# Patient Record
Sex: FEMALE | Race: WHITE | NOT HISPANIC OR LATINO | Employment: OTHER | RURAL
[De-identification: names, ages, dates, MRNs, and addresses within clinical notes are randomized per-mention and may not be internally consistent; named-entity substitution may affect disease eponyms.]

---

## 2022-07-26 ENCOUNTER — HOSPITAL ENCOUNTER (OUTPATIENT)
Dept: RADIOLOGY | Facility: HOSPITAL | Age: 82
Discharge: HOME | End: 2022-07-26
Attending: ORTHOPAEDIC SURGERY
Payer: MEDICARE

## 2022-07-26 DIAGNOSIS — M25.552 HIP PAIN, ACUTE, LEFT: ICD-10-CM

## 2022-07-26 PROCEDURE — 73502 X-RAY EXAM HIP UNI 2-3 VIEWS: CPT | Mod: TC,LT

## 2023-08-24 PROCEDURE — 88305 PATHOLOGY, DERMATOLOGY: ICD-10-PCS | Mod: 26,,, | Performed by: PATHOLOGY

## 2023-08-24 PROCEDURE — 88305 TISSUE EXAM BY PATHOLOGIST: CPT | Mod: TC,SUR | Performed by: DERMATOLOGY

## 2023-08-24 PROCEDURE — 88305 TISSUE EXAM BY PATHOLOGIST: CPT | Mod: 26,,, | Performed by: PATHOLOGY

## 2023-08-25 ENCOUNTER — LAB REQUISITION (OUTPATIENT)
Dept: LAB | Facility: HOSPITAL | Age: 83
End: 2023-08-25
Attending: DERMATOLOGY
Payer: MEDICARE

## 2023-08-25 DIAGNOSIS — D49.2 NEOPLASM OF UNSPECIFIED BEHAVIOR OF BONE, SOFT TISSUE, AND SKIN: ICD-10-CM

## 2023-08-28 LAB
ESTROGEN SERPL-MCNC: NORMAL PG/ML
INSULIN SERPL-ACNC: NORMAL U[IU]/ML
LAB AP GROSS DESCRIPTION: NORMAL
LAB AP LABORATORY NOTES: NORMAL
LAB AP SPEC A DDX: NORMAL
LAB AP SPEC A MORPHOLOGY: NORMAL
LAB AP SPEC A PROCEDURE: NORMAL
LAB AP SPEC B DDX: NORMAL
LAB AP SPEC B MORPHOLOGY: NORMAL
LAB AP SPEC B PROCEDURE: NORMAL
LAB AP SPEC C DDX: NORMAL
LAB AP SPEC C MORPHOLOGY: NORMAL
LAB AP SPEC C PROCEDURE: NORMAL
T3RU NFR SERPL: NORMAL %

## 2023-09-07 PROCEDURE — 88305 TISSUE EXAM BY PATHOLOGIST: CPT | Mod: TC,SUR | Performed by: DERMATOLOGY

## 2023-09-07 PROCEDURE — 88305 PATHOLOGY, DERMATOLOGY: ICD-10-PCS | Mod: 26,,, | Performed by: PATHOLOGY

## 2023-09-07 PROCEDURE — 88305 TISSUE EXAM BY PATHOLOGIST: CPT | Mod: 26,,, | Performed by: PATHOLOGY

## 2023-09-08 ENCOUNTER — LAB REQUISITION (OUTPATIENT)
Dept: LAB | Facility: HOSPITAL | Age: 83
End: 2023-09-08
Attending: DERMATOLOGY
Payer: MEDICARE

## 2023-09-08 DIAGNOSIS — C44.622 SQUAMOUS CELL CARCINOMA OF SKIN OF RIGHT UPPER LIMB, INCLUDING SHOULDER: ICD-10-CM

## 2023-09-11 LAB
ESTROGEN SERPL-MCNC: NORMAL PG/ML
INSULIN SERPL-ACNC: NORMAL U[IU]/ML
LAB AP GROSS DESCRIPTION: NORMAL
LAB AP LABORATORY NOTES: NORMAL
LAB AP SPEC A DDX: NORMAL
LAB AP SPEC A MORPHOLOGY: NORMAL
LAB AP SPEC A PROCEDURE: NORMAL
T3RU NFR SERPL: NORMAL %

## 2023-09-20 PROCEDURE — 88305 TISSUE EXAM BY PATHOLOGIST: CPT | Mod: 26,,, | Performed by: PATHOLOGY

## 2023-09-20 PROCEDURE — 88305 PATHOLOGY, DERMATOLOGY: ICD-10-PCS | Mod: 26,,, | Performed by: PATHOLOGY

## 2023-09-20 PROCEDURE — 88305 TISSUE EXAM BY PATHOLOGIST: CPT | Mod: TC,SUR | Performed by: DERMATOLOGY

## 2023-09-21 ENCOUNTER — LAB REQUISITION (OUTPATIENT)
Dept: LAB | Facility: HOSPITAL | Age: 83
End: 2023-09-21
Attending: DERMATOLOGY
Payer: MEDICARE

## 2023-09-21 DIAGNOSIS — D04.62 CARCINOMA IN SITU OF SKIN OF LEFT UPPER LIMB, INCLUDING SHOULDER: ICD-10-CM

## 2024-10-21 ENCOUNTER — OFFICE VISIT (OUTPATIENT)
Dept: FAMILY MEDICINE | Facility: CLINIC | Age: 84
End: 2024-10-21
Payer: MEDICARE

## 2024-10-21 ENCOUNTER — HOSPITAL ENCOUNTER (OUTPATIENT)
Dept: RADIOLOGY | Facility: HOSPITAL | Age: 84
Discharge: HOME | End: 2024-10-21
Attending: NURSE PRACTITIONER
Payer: MEDICARE

## 2024-10-21 VITALS
BODY MASS INDEX: 31.96 KG/M2 | DIASTOLIC BLOOD PRESSURE: 85 MMHG | OXYGEN SATURATION: 95 % | SYSTOLIC BLOOD PRESSURE: 134 MMHG | WEIGHT: 198.88 LBS | TEMPERATURE: 98 F | HEIGHT: 66 IN | RESPIRATION RATE: 18 BRPM | HEART RATE: 67 BPM

## 2024-10-21 DIAGNOSIS — M79.602 ARM PAIN, ANTERIOR, LEFT: ICD-10-CM

## 2024-10-21 DIAGNOSIS — S51.812A SKIN TEAR OF LEFT FOREARM WITHOUT COMPLICATION, INITIAL ENCOUNTER: Primary | ICD-10-CM

## 2024-10-21 DIAGNOSIS — M79.642 LEFT HAND PAIN: ICD-10-CM

## 2024-10-21 DIAGNOSIS — S41.112A LACERATION OF LEFT ARM WITH COMPLICATION, INITIAL ENCOUNTER: ICD-10-CM

## 2024-10-21 DIAGNOSIS — Z92.29 HX OF LONG TERM USE OF BLOOD THINNERS: ICD-10-CM

## 2024-10-21 PROCEDURE — 73130 X-RAY EXAM OF HAND: CPT | Mod: TC,LT

## 2024-10-21 PROCEDURE — 73090 X-RAY EXAM OF FOREARM: CPT | Mod: 26,LT,, | Performed by: RADIOLOGY

## 2024-10-21 PROCEDURE — 73130 X-RAY EXAM OF HAND: CPT | Mod: 26,LT,, | Performed by: RADIOLOGY

## 2024-10-21 PROCEDURE — 73090 X-RAY EXAM OF FOREARM: CPT | Mod: TC,LT

## 2024-10-21 RX ORDER — ALBUTEROL SULFATE 90 UG/1
1 INHALANT RESPIRATORY (INHALATION) EVERY 4 HOURS PRN
COMMUNITY

## 2024-10-21 RX ORDER — MAGNESIUM 64 MG (MAGNESIUM CHLORIDE) TABLET,DELAYED RELEASE
64
COMMUNITY
Start: 2024-09-08

## 2024-10-21 RX ORDER — DOXAZOSIN 1 MG/1
0.5 TABLET ORAL
COMMUNITY
Start: 2024-09-08

## 2024-10-21 RX ORDER — IBUPROFEN 100 MG/5ML
SUSPENSION, ORAL (FINAL DOSE FORM) ORAL
COMMUNITY

## 2024-10-21 RX ORDER — CEPHALEXIN 500 MG/1
500 CAPSULE ORAL 4 TIMES DAILY
Qty: 10 CAPSULE | Refills: 0 | Status: SHIPPED | OUTPATIENT
Start: 2024-10-21

## 2024-10-21 RX ORDER — HYDROCHLOROTHIAZIDE 12.5 MG/1
12.5 TABLET ORAL
COMMUNITY
Start: 2024-05-24

## 2024-10-21 RX ORDER — NAPROXEN SODIUM 220 MG/1
TABLET, FILM COATED ORAL
COMMUNITY

## 2024-10-21 NOTE — PROGRESS NOTES
Subjective     Patient ID: Aretha Jalloh is a 84 y.o. female.    Chief Complaint: Arm Injury (Patient reports to the clinic today with an injury to her left arm. She was taking her dog out and was pulled into the door facing, she states the skin was pulled back and torn and possibly needs an antibiotic.) and Health Maintenance (Care gaps addressed, patient states she has had her pneumococcal vaccine, declines all further vaccines and screenings today.//Monse Sigala CMA)  She also reports some pain and swelling to her left index finger proximal joint, stating she may have struck the joint on the door facing during the injury to her forearm.     Tetanus shot up to date.   Arm Injury       Review of Systems   Integumentary:  Positive for wound.          Objective     Physical Exam  Cardiovascular:      Rate and Rhythm: Normal rate and regular rhythm.   Pulmonary:      Effort: Pulmonary effort is normal.      Breath sounds: Normal breath sounds.   Musculoskeletal:      Cervical back: Normal range of motion and neck supple.   Neurological:      Mental Status: She is alert.            Assessment and Plan     1. Skin tear of left forearm without complication, initial encounter    2. Arm pain, anterior, left  -     X-Ray Hand Complete Left; Future; Expected date: 10/21/2024    3. Left hand pain  -     X-Ray Forearm Left; Future; Expected date: 10/21/2024    4. Laceration of left arm with complication, initial encounter  -     cephALEXin (KEFLEX) 500 MG capsule; Take 1 capsule (500 mg total) by mouth 4 (four) times daily.  Dispense: 10 capsule; Refill: 0    5. Hx of long term use of blood thinners      Using aseptic technique left forearm skin tear was cleaned thoroughly with wound irrigation solution. The edges of the skin tear were curled under and required local anesthetic to straighten out to try to cover skin tear. Steristrips were randomly placed to keep skin covering anchored. She will eventually most likely lose  the skin flap but the benefit of using this for covering and bleeding control in a patient on blood thinner is great. She will return in two days for wound recheck and dressing change, sooner if needed.            No follow-ups on file.

## 2024-10-23 ENCOUNTER — OFFICE VISIT (OUTPATIENT)
Dept: FAMILY MEDICINE | Facility: CLINIC | Age: 84
End: 2024-10-23
Payer: MEDICARE

## 2024-10-23 VITALS
TEMPERATURE: 98 F | OXYGEN SATURATION: 92 % | BODY MASS INDEX: 31.96 KG/M2 | SYSTOLIC BLOOD PRESSURE: 122 MMHG | HEIGHT: 66 IN | HEART RATE: 84 BPM | WEIGHT: 198.88 LBS | RESPIRATION RATE: 18 BRPM | DIASTOLIC BLOOD PRESSURE: 84 MMHG

## 2024-10-23 DIAGNOSIS — Z92.29 HX OF LONG TERM USE OF BLOOD THINNERS: ICD-10-CM

## 2024-10-23 DIAGNOSIS — S51.812A SKIN TEAR OF LEFT FOREARM WITHOUT COMPLICATION, INITIAL ENCOUNTER: Primary | ICD-10-CM

## 2024-10-23 DIAGNOSIS — S41.112A LACERATION OF LEFT ARM WITH COMPLICATION, INITIAL ENCOUNTER: ICD-10-CM

## 2024-10-23 NOTE — PROGRESS NOTES
Marylou Storey NP   [address] [place] [address]     PATIENT NAME: Aretha Jalloh  : 1940  DATE: 10/23/24  MRN: [id number]      Billing Provider: Marylou Storey NP  Level of Service:   Patient PCP Information       Provider PCP Type    Radha Perry MD General            Reason for Visit / Chief Complaint: Follow-up (Followup skin tear to left forearm )       Update PCP  Update Chief Complaint         History of Present Illness / Problem Focused Workflow     Aretha Jalloh presents to the clinic with Follow-up (Followup skin tear to left forearm )     Follow-up  Pertinent negatives include no abdominal pain, change in bowel habit, chest pain, chills, coughing, fever, headaches, joint swelling, myalgias, numbness or weakness.   Patient reports to the clinic today for follow up on injury to her left arm. She was taking her dog out and was pulled into the door facing, she states the skin was pulled back and torn. She has not removed dressing applied by STEPHON Bazzi NP. She is taking antibiotics as prescribed. Recent xray revealed possible foreign body. Patient is on blood thinners.    Review of Systems     Review of Systems   Constitutional:  Negative for activity change, appetite change, chills and fever.   HENT:  Negative for ear pain, hearing loss, trouble swallowing and voice change.    Eyes:  Negative for visual disturbance.   Respiratory:  Negative for apnea, cough, chest tightness and shortness of breath.    Cardiovascular:  Negative for chest pain, palpitations and leg swelling.   Gastrointestinal:  Negative for abdominal pain, blood in stool, change in bowel habit and reflux.   Genitourinary:  Negative for bladder incontinence, difficulty urinating and flank pain.   Musculoskeletal:  Negative for back pain, gait problem, joint swelling and myalgias.   Integumentary:  Positive for wound. Negative for color change and pallor.   Neurological:  Negative for dizziness, weakness, numbness and  headaches.   Psychiatric/Behavioral:  Negative for sleep disturbance. The patient is not nervous/anxious.       Medical / Social / Family History     Past Medical History:   Diagnosis Date    Essential (primary) hypertension     Hypothyroidism, unspecified     Mixed hyperlipidemia        Past Surgical History:   Procedure Laterality Date    HEART CATH      HYSTERECTOMY      NEPHRECTOMY         Social History  Ms.  reports that she has quit smoking. Her smoking use included cigarettes. She has quit using smokeless tobacco. She reports that she does not currently use alcohol. She reports that she does not use drugs.    Family History  Ms.'s family history is not on file.    Medications and Allergies     Medications  Outpatient Medications Marked as Taking for the 10/23/24 encounter (Office Visit) with Marylou Storey NP   Medication Sig Dispense Refill    albuterol (PROVENTIL/VENTOLIN HFA) 90 mcg/actuation inhaler Inhale 1 puff into the lungs every 4 (four) hours as needed.      amiodarone (PACERONE) 200 MG Tab       ascorbic acid, vitamin C, (VITAMIN C) 1000 MG tablet 1 tablet Orally Once a day for 30 day(s)      aspirin 81 MG Chew 1 tablet Orally Once a day for 30 day(s)      atorvastatin (LIPITOR) 40 MG tablet       cephALEXin (KEFLEX) 500 MG capsule Take 1 capsule (500 mg total) by mouth 4 (four) times daily. 10 capsule 0    chlorthalidone (HYGROTEN) 25 MG Tab 1 tablet in the morning with food      cholecalciferol, vitamin D3, (VITAMIN D3) 250 mcg (10,000 unit) Cap capsule Vitamin D3 250 MCG (46603 UT) Oral Capsule QTY: 90 capsule Days: 90 Refills: 2  Written: 05/23/22 Patient Instructions: once a day      doxazosin (CARDURA) 1 MG tablet Take 0.5 mg by mouth.      ELIQUIS 2.5 mg Tab Take 2.5 mg by mouth 2 (two) times daily.      esomeprazole (NEXIUM 24HR) 20 MG capsule 1 capsule      EUTHYROX 50 mcg tablet Take 50 mcg by mouth once daily.      hydroCHLOROthiazide (HYDRODIURIL) 12.5 MG Tab Take 12.5 mg by mouth.    "   MAG 64 64 mg TbEC Take 64 mg by mouth.      metoprolol succinate (TOPROL-XL) 25 MG 24 hr tablet       nitroGLYCERIN (NITROSTAT) 0.4 MG SL tablet Nitroglycerin 0.4 MG Sublingual Tablet Sublingual QTY: 0 tablet Days: 0 Refills: 0  Written: 09/15/21 Patient Instructions:      potassium chloride (MICRO-K) 10 MEQ CpSR Take 20 mEq by mouth once daily.         Allergies  Review of patient's allergies indicates:   Allergen Reactions    Amlodipine      Other Reaction(s):  Intolerance    Hydralazine      Other Reaction(s):  Intolerance    Lisinopril      Other Reaction(s):  Intolerance    Penicillins      Other reaction(s): Unknown       Physical Examination   /84 (BP Location: Left arm, Patient Position: Sitting)   Pulse 84   Temp 97.7 °F (36.5 °C) (Temporal)   Resp 18   Ht 5' 6" (1.676 m)   Wt 90.2 kg (198 lb 13.7 oz)   SpO2 (!) 92%   BMI 32.10 kg/m²    Physical Exam  Vitals and nursing note reviewed.   Constitutional:       Appearance: Normal appearance.   HENT:      Head: Normocephalic.      Nose: Nose normal.      Mouth/Throat:      Mouth: Mucous membranes are moist.   Eyes:      Extraocular Movements: Extraocular movements intact.      Conjunctiva/sclera: Conjunctivae normal.      Pupils: Pupils are equal, round, and reactive to light.   Cardiovascular:      Rate and Rhythm: Normal rate and regular rhythm.      Pulses: Normal pulses.      Heart sounds: Normal heart sounds.   Pulmonary:      Effort: Pulmonary effort is normal.      Breath sounds: Normal breath sounds.   Abdominal:      General: Abdomen is flat. Bowel sounds are normal.      Palpations: Abdomen is soft.   Musculoskeletal:         General: Normal range of motion.      Cervical back: Normal range of motion and neck supple.   Skin:     General: Skin is warm and dry.      Capillary Refill: Capillary refill takes less than 2 seconds.      Comments: Bandages removed carefully without further skin tears. Skin flap still secured with steri-strips. " No approximation or scabbing at current.    Neurological:      General: No focal deficit present.      Mental Status: She is alert and oriented to person, place, and time.   Psychiatric:         Behavior: Behavior normal.         Thought Content: Thought content normal.        Assessment and Plan (including Health Maintenance)      Problem List  Smart Sets  Document Outside HM   :    Plan:   Referral to wound care in progress to evaluate and treat. Will address foreign body at visit.  Continue antibiotics. Keep dressing intact. Will instruct on when to change pending when appointment with wound care is.   Voiced understanding.   Cleansed wound in clinic and redressed.  RTC as needed.      Health Maintenance Due   Topic Date Due    Lipid Panel  Never done    TETANUS VACCINE  Never done    DEXA Scan  Never done    Shingles Vaccine (1 of 2) Never done    Pneumococcal Vaccines (Age 65+) (1 of 1 - PCV) Never done    RSV Vaccine (Age 60+ and Pregnant patients) (1 - 1-dose 75+ series) Never done    Influenza Vaccine (1) Never done    COVID-19 Vaccine (2 - 2024-25 season) 09/01/2024       Problem List Items Addressed This Visit    None  Visit Diagnoses       Skin tear of left forearm without complication, initial encounter    -  Primary    Relevant Orders    Ambulatory referral/consult to Wound Clinic    Laceration of left arm with complication, initial encounter        Relevant Orders    Ambulatory referral/consult to Wound Clinic    Hx of long term use of blood thinners        Relevant Orders    Ambulatory referral/consult to Wound Clinic            The patient has no Health Maintenance topics of status Not Due    No future appointments.         Signature:  Marylou Storey NP      [address]  [place] [address]    Date of encounter: 10/23/24

## 2024-10-30 ENCOUNTER — OFFICE VISIT (OUTPATIENT)
Dept: WOUND CARE | Facility: HOSPITAL | Age: 84
End: 2024-10-30
Attending: FAMILY MEDICINE
Payer: MEDICARE

## 2024-10-30 VITALS
TEMPERATURE: 98 F | DIASTOLIC BLOOD PRESSURE: 98 MMHG | RESPIRATION RATE: 18 BRPM | HEART RATE: 67 BPM | SYSTOLIC BLOOD PRESSURE: 176 MMHG

## 2024-10-30 DIAGNOSIS — S51.812A SKIN TEAR OF LEFT FOREARM WITHOUT COMPLICATION, INITIAL ENCOUNTER: ICD-10-CM

## 2024-10-30 DIAGNOSIS — S41.112A LACERATION OF LEFT ARM WITH COMPLICATION, INITIAL ENCOUNTER: ICD-10-CM

## 2024-10-30 DIAGNOSIS — Z92.29 HX OF LONG TERM USE OF BLOOD THINNERS: ICD-10-CM

## 2024-10-30 PROCEDURE — 99203 OFFICE O/P NEW LOW 30 MIN: CPT | Mod: ,,, | Performed by: FAMILY MEDICINE

## 2024-10-30 NOTE — PROGRESS NOTES
Subjective:      Patient ID: Aretha Jalloh is a 84 y.o. female.    Chief Complaint: Non-healing Wound Follow Up    Aretha Jalloh a 84 y.o. female presents for follow up on all regular problems which are reviewed and discussed.   Reviewed past notes  Pt interview  Exam  Pictures pt had.   Wound better  Problem List Items Addressed This Visit          Hematology    Hx of long term use of blood thinners       Orthopedic    Laceration of left arm with complication, initial encounter    Skin tear of left forearm without complication       Past Medical History:  Past Medical History:   Diagnosis Date    Essential (primary) hypertension     Hypothyroidism, unspecified     Mixed hyperlipidemia      Past Surgical History:   Procedure Laterality Date    HEART CATH      HYSTERECTOMY      NEPHRECTOMY       Review of patient's allergies indicates:   Allergen Reactions    Amlodipine      Other Reaction(s):  Intolerance    Hydralazine      Other Reaction(s):  Intolerance    Lisinopril      Other Reaction(s):  Intolerance    Penicillins      Other reaction(s): Unknown     Current Outpatient Medications on File Prior to Visit   Medication Sig Dispense Refill    albuterol (PROVENTIL/VENTOLIN HFA) 90 mcg/actuation inhaler Inhale 1 puff into the lungs every 4 (four) hours as needed.      amiodarone (PACERONE) 200 MG Tab       ascorbic acid, vitamin C, (VITAMIN C) 1000 MG tablet 1 tablet Orally Once a day for 30 day(s)      aspirin 81 MG Chew 1 tablet Orally Once a day for 30 day(s)      atorvastatin (LIPITOR) 40 MG tablet       chlorthalidone (HYGROTEN) 25 MG Tab 1 tablet in the morning with food      cholecalciferol, vitamin D3, (VITAMIN D3) 250 mcg (10,000 unit) Cap capsule Vitamin D3 250 MCG (78423 UT) Oral Capsule QTY: 90 capsule Days: 90 Refills: 2  Written: 05/23/22 Patient Instructions: once a day      doxazosin (CARDURA) 1 MG tablet Take 0.5 mg by mouth.      ELIQUIS 2.5 mg Tab Take 2.5 mg by mouth 2 (two) times daily.       esomeprazole (NEXIUM 24HR) 20 MG capsule 1 capsule      EUTHYROX 50 mcg tablet Take 50 mcg by mouth once daily.      hydroCHLOROthiazide (HYDRODIURIL) 12.5 MG Tab Take 12.5 mg by mouth.      MAG 64 64 mg TbEC Take 64 mg by mouth.      metoprolol succinate (TOPROL-XL) 25 MG 24 hr tablet       nitroGLYCERIN (NITROSTAT) 0.4 MG SL tablet Nitroglycerin 0.4 MG Sublingual Tablet Sublingual QTY: 0 tablet Days: 0 Refills: 0  Written: 09/15/21 Patient Instructions:      potassium chloride (MICRO-K) 10 MEQ CpSR Take 20 mEq by mouth once daily.      cephALEXin (KEFLEX) 500 MG capsule Take 1 capsule (500 mg total) by mouth 4 (four) times daily. (Patient not taking: Reported on 10/30/2024) 10 capsule 0     No current facility-administered medications on file prior to visit.     Social History     Socioeconomic History    Marital status:    Tobacco Use    Smoking status: Former     Types: Cigarettes    Smokeless tobacco: Former   Substance and Sexual Activity    Alcohol use: Not Currently    Drug use: Never    Sexual activity: Not Currently     No family history on file.    Review of Systems   Constitutional: Negative.  Negative for activity change, appetite change, chills and diaphoresis.   HENT: Negative.  Negative for congestion, ear pain, hearing loss and postnasal drip.    Eyes:  Negative for itching.   Respiratory:  Negative for chest tightness and shortness of breath.    Cardiovascular:  Negative for chest pain.   Gastrointestinal:  Negative for abdominal pain.   Endocrine: Negative for polydipsia.   Genitourinary:  Negative for frequency.   Musculoskeletal:  Positive for arthralgias and myalgias. Negative for back pain.   Skin:  Positive for pallor and wound. Negative for color change and rash.   Neurological:  Negative for headaches.       Objective:     BP (!) 176/98   Pulse 67   Temp 98.3 °F (36.8 °C)   Resp 18     Physical Exam  Constitutional:       Appearance: Normal appearance. She is obese.   HENT:       Head: Normocephalic and atraumatic.      Right Ear: External ear normal.      Left Ear: External ear normal.      Nose: Nose normal.      Mouth/Throat:      Mouth: Mucous membranes are moist.      Pharynx: Oropharynx is clear.   Eyes:      Pupils: Pupils are equal, round, and reactive to light.   Cardiovascular:      Rate and Rhythm: Normal rate and regular rhythm.      Heart sounds: Normal heart sounds.   Pulmonary:      Effort: Pulmonary effort is normal.      Breath sounds: Normal breath sounds.   Abdominal:      Palpations: Abdomen is soft.   Musculoskeletal:         General: Tenderness and signs of injury present. No swelling or deformity.      Cervical back: Normal range of motion and neck supple.      Right lower leg: No edema.      Left lower leg: No edema.   Skin:     General: Skin is warm and dry.      Coloration: Skin is not jaundiced or pale.      Findings: Bruising and lesion present. No erythema or rash.   Neurological:      General: No focal deficit present.      Mental Status: She is alert and oriented to person, place, and time. Mental status is at baseline.   Psychiatric:         Mood and Affect: Mood normal.         Behavior: Behavior normal.         Thought Content: Thought content normal.         Judgment: Judgment normal.         1. Laceration of left arm with complication, initial encounter    2. Skin tear of left forearm without complication, initial encounter    3. Hx of long term use of blood thinners        Plan:     Problem List Items Addressed This Visit          Hematology    Hx of long term use of blood thinners       Orthopedic    Laceration of left arm with complication, initial encounter    Skin tear of left forearm without complication     No follow-ups on file.  Looks good well approximated edges  No malodour  Continue cleaning non adherent bandage 1 wk fu  Had td    I am having Aretha Jalloh maintain her amiodarone, ELIQUIS, atorvastatin, cholecalciferol (vitamin D3),  chlorthalidone, esomeprazole, EUTHYROX, metoprolol succinate, nitroGLYCERIN, potassium chloride, albuterol, ascorbic acid (vitamin C), aspirin, doxazosin, hydroCHLOROthiazide, MAG 64, and cephALEXin.    Aretha was seen today for non-healing wound follow up.    Diagnoses and all orders for this visit:    Laceration of left arm with complication, initial encounter  -     Ambulatory referral/consult to Wound Clinic    Skin tear of left forearm without complication, initial encounter  -     Ambulatory referral/consult to Wound Clinic    Hx of long term use of blood thinners  -     Ambulatory referral/consult to Wound Clinic         [unfilled]  No orders of the defined types were placed in this encounter.

## 2024-12-23 ENCOUNTER — OFFICE VISIT (OUTPATIENT)
Dept: FAMILY MEDICINE | Facility: CLINIC | Age: 84
End: 2024-12-23
Payer: MEDICARE

## 2024-12-23 VITALS
DIASTOLIC BLOOD PRESSURE: 77 MMHG | WEIGHT: 196.63 LBS | HEIGHT: 66 IN | HEART RATE: 93 BPM | OXYGEN SATURATION: 95 % | SYSTOLIC BLOOD PRESSURE: 131 MMHG | RESPIRATION RATE: 20 BRPM | BODY MASS INDEX: 31.6 KG/M2 | TEMPERATURE: 98 F

## 2024-12-23 DIAGNOSIS — R19.7 DIARRHEA, UNSPECIFIED TYPE: Primary | ICD-10-CM

## 2024-12-23 DIAGNOSIS — R10.9 ABDOMINAL PAIN, UNSPECIFIED ABDOMINAL LOCATION: ICD-10-CM

## 2024-12-23 DIAGNOSIS — I10 PRIMARY HYPERTENSION: Chronic | ICD-10-CM

## 2024-12-23 LAB
ALBUMIN SERPL BCP-MCNC: 3.3 G/DL (ref 3.4–4.8)
ALBUMIN/GLOB SERPL: 1 {RATIO}
ALP SERPL-CCNC: 129 U/L (ref 40–150)
ALT SERPL W P-5'-P-CCNC: 25 U/L
ANION GAP SERPL CALCULATED.3IONS-SCNC: 12 MMOL/L (ref 7–16)
AST SERPL W P-5'-P-CCNC: 28 U/L (ref 5–34)
BASOPHILS # BLD AUTO: 0.05 K/UL (ref 0–0.2)
BASOPHILS NFR BLD AUTO: 0.6 % (ref 0–1)
BILIRUB SERPL-MCNC: 0.9 MG/DL
BUN SERPL-MCNC: 19 MG/DL (ref 10–20)
BUN/CREAT SERPL: 12 (ref 6–20)
CALCIUM SERPL-MCNC: 8.2 MG/DL (ref 8.4–10.2)
CHLORIDE SERPL-SCNC: 109 MMOL/L (ref 98–107)
CO2 SERPL-SCNC: 22 MMOL/L (ref 23–31)
CREAT SERPL-MCNC: 1.57 MG/DL (ref 0.55–1.02)
DIFFERENTIAL METHOD BLD: ABNORMAL
EGFR (NO RACE VARIABLE) (RUSH/TITUS): 33 ML/MIN/1.73M2
EOSINOPHIL # BLD AUTO: 0.14 K/UL (ref 0–0.5)
EOSINOPHIL NFR BLD AUTO: 1.8 % (ref 1–4)
ERYTHROCYTE [DISTWIDTH] IN BLOOD BY AUTOMATED COUNT: 13.1 % (ref 11.5–14.5)
GLOBULIN SER-MCNC: 3.2 G/DL (ref 2–4)
GLUCOSE SERPL-MCNC: 85 MG/DL (ref 82–115)
HCT VFR BLD AUTO: 42.4 % (ref 38–47)
HGB BLD-MCNC: 13.2 G/DL (ref 12–16)
IMM GRANULOCYTES # BLD AUTO: 0.02 K/UL (ref 0–0.04)
IMM GRANULOCYTES NFR BLD: 0.3 % (ref 0–0.4)
LYMPHOCYTES # BLD AUTO: 0.54 K/UL (ref 1–4.8)
LYMPHOCYTES NFR BLD AUTO: 6.8 % (ref 27–41)
MCH RBC QN AUTO: 31.2 PG (ref 27–31)
MCHC RBC AUTO-ENTMCNC: 31.1 G/DL (ref 32–36)
MCV RBC AUTO: 100.2 FL (ref 80–96)
MONOCYTES # BLD AUTO: 0.8 K/UL (ref 0–0.8)
MONOCYTES NFR BLD AUTO: 10.1 % (ref 2–6)
MPC BLD CALC-MCNC: 10.5 FL (ref 9.4–12.4)
NEUTROPHILS # BLD AUTO: 6.37 K/UL (ref 1.8–7.7)
NEUTROPHILS NFR BLD AUTO: 80.4 % (ref 53–65)
NRBC # BLD AUTO: 0 X10E3/UL
NRBC, AUTO (.00): 0 %
PLATELET # BLD AUTO: 179 K/UL (ref 150–400)
POTASSIUM SERPL-SCNC: 4 MMOL/L (ref 3.5–5.1)
PROT SERPL-MCNC: 6.5 G/DL (ref 5.8–7.6)
RBC # BLD AUTO: 4.23 M/UL (ref 4.2–5.4)
SODIUM SERPL-SCNC: 139 MMOL/L (ref 136–145)
WBC # BLD AUTO: 7.92 K/UL (ref 4.5–11)

## 2024-12-23 PROCEDURE — 99204 OFFICE O/P NEW MOD 45 MIN: CPT | Mod: ,,, | Performed by: FAMILY MEDICINE

## 2024-12-23 PROCEDURE — 36415 COLL VENOUS BLD VENIPUNCTURE: CPT | Mod: ,,, | Performed by: FAMILY MEDICINE

## 2024-12-23 PROCEDURE — 80053 COMPREHEN METABOLIC PANEL: CPT | Mod: ,,, | Performed by: CLINICAL MEDICAL LABORATORY

## 2024-12-23 PROCEDURE — 85025 COMPLETE CBC W/AUTO DIFF WBC: CPT | Mod: ,,, | Performed by: CLINICAL MEDICAL LABORATORY

## 2024-12-23 RX ORDER — METRONIDAZOLE 500 MG/1
500 TABLET ORAL 4 TIMES DAILY
Qty: 40 TABLET | Refills: 0 | Status: SHIPPED | OUTPATIENT
Start: 2024-12-23

## 2024-12-24 ENCOUNTER — CLINICAL SUPPORT (OUTPATIENT)
Dept: FAMILY MEDICINE | Facility: CLINIC | Age: 84
End: 2024-12-24
Payer: MEDICARE

## 2024-12-24 DIAGNOSIS — R19.7 DIARRHEA, UNSPECIFIED TYPE: Primary | ICD-10-CM

## 2024-12-24 LAB
FECAL LEUKOCYTES: POSITIVE
GIARDIA ANTIGEN: NEGATIVE
H. PYLORI STOOL: NEGATIVE
OCCULT BLOOD: POSITIVE

## 2024-12-24 PROCEDURE — 82705 FATS/LIPIDS FECES QUAL: CPT | Mod: ,,, | Performed by: CLINICAL MEDICAL LABORATORY

## 2024-12-24 PROCEDURE — 87045 FECES CULTURE AEROBIC BACT: CPT | Mod: ,,, | Performed by: CLINICAL MEDICAL LABORATORY

## 2024-12-24 PROCEDURE — 87046 STOOL CULTR AEROBIC BACT EA: CPT | Mod: ,,, | Performed by: CLINICAL MEDICAL LABORATORY

## 2024-12-24 PROCEDURE — 87493 C DIFF AMPLIFIED PROBE: CPT | Mod: ,,, | Performed by: CLINICAL MEDICAL LABORATORY

## 2024-12-25 LAB
C COLI+JEJ+UPSA DNA STL QL NAA+NON-PROBE: NORMAL
C DIFF TOX A+B STL IA-ACNC: NEGATIVE
CAMPYLOBACTER ANTIGEN: NEGATIVE
E COLI SXT1 STL QL IA: NORMAL
E COLI SXT2 STL QL IA: NORMAL
EHEC (SHIGA TOXIN 1): NEGATIVE
EHEC (SHIGA TOXIN 2): NEGATIVE
FATTY ACIDS: NORMAL /HPF
NEUTRAL FATS: NORMAL /HPF
NOROVIRUS GI+II RNA STL QL NAA+NON-PROBE: NORMAL
RVA RNA STL QL NAA+NON-PROBE: NORMAL
S ENT+BONG DNA STL QL NAA+NON-PROBE: NORMAL
SHIGELLA SPECIES NAT: NORMAL
V CHOL+PARA+VUL DNA STL QL NAA+NON-PROBE: NORMAL
Y ENTEROCOL DNA STL QL NAA+NON-PROBE: NORMAL

## 2024-12-26 PROBLEM — I10 PRIMARY HYPERTENSION: Chronic | Status: ACTIVE | Noted: 2024-12-26

## 2024-12-26 LAB — SALM+SHIG+E COLI ETEC STL CULT: NORMAL

## 2024-12-26 NOTE — PROGRESS NOTES
"Subjective     Patient ID: Aretha Jalloh is a 84 y.o. female.    Chief Complaint: Diarrhea    History of Present Illness    CHIEF COMPLAINT:  Patient presents today with diarrhea and abdominal pain.    HISTORY OF PRESENT ILLNESS:  She reports diarrhea starting one week ago, occurring at least 10 times daily with water passing right through. She experiences excruciating abdominal pain that initially started on one side and moved under the breast bone. She denies fever, vomiting, and nausea. She notes black stools with Pepto-Bismol use. She has diverticulitis with most recent flare-up one week ago.    MEDICAL HISTORY:  She has a history of myocardial infarction in 2018. She also has a history of kidney tumor requiring nephrectomy and is followed by nephrology.    MEDICATIONS:  She takes Eliquis twice daily and potassium twice daily.    SURGICAL HISTORY:  Past surgical history includes nephrectomy for kidney tumor.    DIET:  She reports limited meat consumption.      ROS:  Constitutional: -fevers  Gastrointestinal: +abdominal pain, -nausea, -vomiting, +diarrhea              Objective   Blood pressure 131/77, pulse 93, temperature 97.9 °F (36.6 °C), temperature source Oral, resp. rate 20, height 5' 6" (1.676 m), weight 89.2 kg (196 lb 9.6 oz), SpO2 95%.    Physical Exam  Constitutional:       Appearance: Normal appearance.   HENT:      Head: Normocephalic and atraumatic.      Right Ear: External ear normal.      Left Ear: External ear normal.      Nose: Nose normal.      Mouth/Throat:      Mouth: Mucous membranes are moist.   Eyes:      Conjunctiva/sclera: Conjunctivae normal.      Pupils: Pupils are equal, round, and reactive to light.   Cardiovascular:      Rate and Rhythm: Normal rate and regular rhythm.      Pulses: Normal pulses.      Heart sounds: Normal heart sounds.   Pulmonary:      Effort: Pulmonary effort is normal.      Breath sounds: Normal breath sounds.   Abdominal:      General: Abdomen is flat.      " Palpations: Abdomen is soft.      Tenderness: There is abdominal tenderness (mild lower abdominal tenderness). There is no guarding or rebound.   Musculoskeletal:         General: Normal range of motion.      Cervical back: Normal range of motion and neck supple.   Skin:     General: Skin is warm and dry.   Neurological:      General: No focal deficit present.      Mental Status: She is alert and oriented to person, place, and time.   Psychiatric:         Mood and Affect: Mood normal.         Behavior: Behavior normal.         Thought Content: Thought content normal.         Judgment: Judgment normal.            Assessment and Plan   Assessment & Plan    IMPRESSION:  - Considered diverticulitis vs. other GI infections given patient's history and current symptoms  - Opted to collect stool studies before initiating antibiotic treatment to identify specific pathogen  - Recommend supportive care and dietary modifications while awaiting test results  - Will prescribe Flagyl (metronidazole) as a contingency plan, to be started only after specimen collection if symptoms worsen    SUSPECTED DIVERTICULITIS:  - Assessed the patient's condition, noting atypical symptoms for diverticulitis, including severe diarrhea and abdominal pain.  - Ordered stool cultures to rule out other infections such as salmonella, shigella, or C. difficile.  - Prescribed Flagyl (metronidazole) 500 mg 4 times daily for 7-10 days, to be started only if symptoms worsen before follow-up.  - Instructed the patient to delay starting antibiotics until after stool specimen collection.  - Educated the patient about potential side effects of Flagyl, including metallic taste and nausea.  - Emphasized the importance of completing the full course of antibiotics if started.  - Evaluated the patient's report of severe abdominal pain, primarily on the left side but also on the right.  - Noted the abdominal pain in relation to the patient's history of diverticulitis  and current symptoms.    DIARRHEA AND ABDOMINAL PAIN:  - Ordered CBC (Complete Blood Count), stool culture, and electrolyte panel (including potassium).  - Recommend dietary changes, including the BRAT diet (Bananas, Rice, Applesauce, Toast) and avoiding caffeine, fiber, greasy, and spicy foods.  - Suggested probiotics, either in pill form or through yogurt like Activia, to help re-establish normal intestinal bridget.  - Instructed the patient to bring a stool specimen to the clinic before 12 PM the next day.  - Explained BRAT diet (Bananas, Rice, Applesauce, Toast) and other bland food options to manage diarrhea.  - Discussed importance of maintaining hydration, including use of electrolyte-rich fluids.  - Patient to avoid caffeine, fiber-rich foods, greasy foods, and spicy foods.  - Patient to stick to bland diet (e.g., eggs, toast, noodle soup).  - Recommend staying hydrated with clear fluids, sports drinks, or electrolyte solutions.  - Patient can consider adding small amounts of probiotic-rich foods (e.g., yogurt) to diet.    HISTORY OF MYOCARDIAL INFARCTION:  - Noted the patient's history of myocardial infarction in 2018.  - Continued current anticoagulant therapy with Eliquis twice daily.  - Confirmed the patient's long-term anticoagulant therapy with Eliquis, taken twice daily following a myocardial infarction in 2018.    HISTORY OF UNILATERAL NEPHRECTOMY:  - Noted the patient's history of unilateral nephrectomy due to tumor removal.  - Confirmed ongoing care under Dr. Hernandez for kidney-related issues.    HISTORY OF COLON POLYPS:  - Reviewed the patient's history of colon polyps discovered during previous colonoscopies.  - Noted that the patient's last colonoscopy was performed by Dr. Mackey, who recommended a 5-year follow-up interval.  - Advised adherence to the recommended colonoscopy schedule for ongoing monitoring of colon health.    FOLLOW UP:  - Scheduled follow-up on Thursday for review of test  results and treatment plan.  - Directed office staff to review culture results when available.         1. Diarrhea, unspecified type  -     metroNIDAZOLE (FLAGYL) 500 MG tablet; Take 1 tablet (500 mg total) by mouth 4 (four) times daily.  Dispense: 40 tablet; Refill: 0  -     C Diff Toxin by PCR; Future; Expected date: 12/23/2024  -     Ova and Parasite Exam; Future; Expected date: 12/23/2024  -     Giardia antigen; Future; Expected date: 12/23/2024  -     Fecal leukocytes; Future; Expected date: 12/23/2024  -     Enteric Pathogen Panel; Future; Expected date: 12/23/2024    2. Primary hypertension  -     CBC Auto Differential  -     Comprehensive Metabolic Panel    3. Abdominal pain, unspecified abdominal location        Be sure to hydrate and follow up with her regular physician.          Follow up if symptoms worsen or fail to improve.

## 2024-12-27 LAB
O+P STL CONC: NORMAL
TRICHROME SMEAR: NORMAL